# Patient Record
Sex: FEMALE | Race: WHITE | NOT HISPANIC OR LATINO
[De-identification: names, ages, dates, MRNs, and addresses within clinical notes are randomized per-mention and may not be internally consistent; named-entity substitution may affect disease eponyms.]

---

## 2019-02-19 PROBLEM — Z00.00 ENCOUNTER FOR PREVENTIVE HEALTH EXAMINATION: Status: ACTIVE | Noted: 2019-02-19

## 2019-03-12 ENCOUNTER — RECORD ABSTRACTING (OUTPATIENT)
Age: 64
End: 2019-03-12

## 2019-03-12 DIAGNOSIS — Z86.59 PERSONAL HISTORY OF OTHER MENTAL AND BEHAVIORAL DISORDERS: ICD-10-CM

## 2019-03-12 DIAGNOSIS — Z81.8 FAMILY HISTORY OF OTHER MENTAL AND BEHAVIORAL DISORDERS: ICD-10-CM

## 2019-03-12 DIAGNOSIS — Z82.49 FAMILY HISTORY OF ISCHEMIC HEART DISEASE AND OTHER DISEASES OF THE CIRCULATORY SYSTEM: ICD-10-CM

## 2019-03-12 DIAGNOSIS — Z83.71 FAMILY HISTORY OF COLONIC POLYPS: ICD-10-CM

## 2019-03-12 DIAGNOSIS — Z84.1 FAMILY HISTORY OF DISORDERS OF KIDNEY AND URETER: ICD-10-CM

## 2019-03-12 DIAGNOSIS — Z87.891 PERSONAL HISTORY OF NICOTINE DEPENDENCE: ICD-10-CM

## 2019-04-30 ENCOUNTER — APPOINTMENT (OUTPATIENT)
Dept: RHEUMATOLOGY | Facility: CLINIC | Age: 64
End: 2019-04-30
Payer: COMMERCIAL

## 2019-04-30 VITALS
SYSTOLIC BLOOD PRESSURE: 130 MMHG | DIASTOLIC BLOOD PRESSURE: 80 MMHG | WEIGHT: 185 LBS | HEIGHT: 68 IN | BODY MASS INDEX: 28.04 KG/M2

## 2019-04-30 DIAGNOSIS — Z78.9 OTHER SPECIFIED HEALTH STATUS: ICD-10-CM

## 2019-04-30 DIAGNOSIS — M79.646 PAIN IN UNSPECIFIED FINGER(S): ICD-10-CM

## 2019-04-30 DIAGNOSIS — Z86.39 PERSONAL HISTORY OF OTHER ENDOCRINE, NUTRITIONAL AND METABOLIC DISEASE: ICD-10-CM

## 2019-04-30 DIAGNOSIS — Z82.69 FAMILY HISTORY OF OTHER DISEASES OF THE MUSCULOSKELETAL SYSTEM AND CONNECTIVE TISSUE: ICD-10-CM

## 2019-04-30 PROCEDURE — 99244 OFF/OP CNSLTJ NEW/EST MOD 40: CPT

## 2019-04-30 RX ORDER — CHOLECALCIFEROL (VITAMIN D3) 50 MCG
50 MCG TABLET ORAL
Refills: 0 | Status: ACTIVE | COMMUNITY
Start: 2019-04-30

## 2019-04-30 NOTE — HISTORY OF PRESENT ILLNESS
[FreeTextEntry1] : 64 yo female referred by Dr. Kent for evaluation of hand pain.  She reports pain at the base of her thumbs, L>R, for many years.  A few weeks ago, she woke up and her right pinky finger was painful and swollen.  She takes 2 tabs of Advil if the pain gets really bad, which helps sometimes.  Pain is only with use.  \par No rashes.  No personal or FH of psoriasis.  \par She has low back pain, worse in the morning when she first wakes up, with associated stiffness.\par No oral ulcers.  No dry eyes or dry mouth.  No Raynauds.  No photosensitivity.\par + fatigue.  She falls asleep but has difficulty staying asleep.  She wakes up feeling tired. \par Her legs feel heavy at night.\par She also reports knee pain when going up or down stairs.

## 2019-04-30 NOTE — DATA REVIEWED
[FreeTextEntry1] : labs from 4/19/19 reviewed: RF negative, anti-CCP negative, anti-dsDNA negative, MARIA EUGENIA negative, lyme negative, UA negative, TSH normal, vit D 30.9, vit B12 452, HIV negative, HCV negative, CBC and CMP normal,

## 2019-04-30 NOTE — ASSESSMENT
[FreeTextEntry1] : Chelly has clinical findings consistent with a diagnosis of osteoarthritis.  X-ray of hands ordered to assess for evidence of erosive OA in her right PIP5.  We have discussed using topical NSAIDs up to 4 times a day, and she may add oral NSAIDs PRN (Advil 3 tabs or Aleve 1-2 tabs) severe pain.  There is no clinical evidence of rheumatoid arthritis at this time.

## 2019-06-12 ENCOUNTER — APPOINTMENT (OUTPATIENT)
Dept: BREAST CENTER | Facility: CLINIC | Age: 64
End: 2019-06-12
Payer: COMMERCIAL

## 2019-06-12 VITALS
HEIGHT: 68 IN | DIASTOLIC BLOOD PRESSURE: 94 MMHG | BODY MASS INDEX: 28.19 KG/M2 | SYSTOLIC BLOOD PRESSURE: 150 MMHG | HEART RATE: 66 BPM | OXYGEN SATURATION: 98 % | WEIGHT: 186 LBS | RESPIRATION RATE: 18 BRPM

## 2019-06-12 PROCEDURE — 99203 OFFICE O/P NEW LOW 30 MIN: CPT

## 2019-06-12 NOTE — PHYSICAL EXAM
[Symmetrical] : symmetrical [Examined in the supine and seated position] : examined in the supine and seated position [No dominant masses] : no dominant masses in right breast  [No dominant masses] : no dominant masses left breast [No Nipple Retraction] : no left nipple retraction [No Nipple Discharge] : no left nipple discharge [No Axillary Lymphadenopathy] : no right axillary lymphadenopathy [No Rashes] : no rashes [No Ulceration] : no ulceration [de-identified] : + ecchymosis at 0100 region at the site of biopsy [de-identified] : + palpable, soft, large LAD

## 2019-06-19 ENCOUNTER — RESULT REVIEW (OUTPATIENT)
Age: 64
End: 2019-06-19

## 2019-06-20 ENCOUNTER — RESULT REVIEW (OUTPATIENT)
Age: 64
End: 2019-06-20

## 2019-06-21 ENCOUNTER — RESULT REVIEW (OUTPATIENT)
Age: 64
End: 2019-06-21

## 2019-06-25 ENCOUNTER — RESULT REVIEW (OUTPATIENT)
Age: 64
End: 2019-06-25

## 2019-06-25 ENCOUNTER — OTHER (OUTPATIENT)
Age: 64
End: 2019-06-25

## 2019-06-25 ENCOUNTER — MOBILE ON CALL (OUTPATIENT)
Age: 64
End: 2019-06-25

## 2019-06-26 ENCOUNTER — RESULT REVIEW (OUTPATIENT)
Age: 64
End: 2019-06-26

## 2019-06-26 ENCOUNTER — APPOINTMENT (OUTPATIENT)
Dept: BREAST CENTER | Facility: HOSPITAL | Age: 64
End: 2019-06-26

## 2019-07-01 ENCOUNTER — APPOINTMENT (OUTPATIENT)
Dept: BREAST CENTER | Facility: CLINIC | Age: 64
End: 2019-07-01
Payer: COMMERCIAL

## 2019-07-01 VITALS
BODY MASS INDEX: 28.19 KG/M2 | OXYGEN SATURATION: 99 % | HEART RATE: 64 BPM | DIASTOLIC BLOOD PRESSURE: 99 MMHG | WEIGHT: 186 LBS | SYSTOLIC BLOOD PRESSURE: 169 MMHG | HEIGHT: 68 IN | RESPIRATION RATE: 18 BRPM

## 2019-07-01 PROCEDURE — 99024 POSTOP FOLLOW-UP VISIT: CPT

## 2019-07-01 NOTE — PHYSICAL EXAM
[Examined in the supine and seated position] : examined in the supine and seated position [Symmetrical] : symmetrical [No Axillary Lymphadenopathy] : no left axillary lymphadenopathy [No Rashes] : no rashes [No Ulceration] : no ulceration [de-identified] : + ecchymosis to right breast and left axillary incision. no signs of infection noted.  [de-identified] : + contact dermatitis to upper quadrants of abdomen.

## 2019-07-15 ENCOUNTER — TRANSCRIPTION ENCOUNTER (OUTPATIENT)
Age: 64
End: 2019-07-15

## 2019-08-07 ENCOUNTER — RESULT REVIEW (OUTPATIENT)
Age: 64
End: 2019-08-07

## 2019-08-07 ENCOUNTER — APPOINTMENT (OUTPATIENT)
Dept: HEMATOLOGY ONCOLOGY | Facility: CLINIC | Age: 64
End: 2019-08-07
Payer: COMMERCIAL

## 2019-08-07 VITALS
OXYGEN SATURATION: 96 % | TEMPERATURE: 97.5 F | HEART RATE: 58 BPM | SYSTOLIC BLOOD PRESSURE: 160 MMHG | DIASTOLIC BLOOD PRESSURE: 94 MMHG | HEIGHT: 68 IN | RESPIRATION RATE: 18 BRPM

## 2019-08-07 DIAGNOSIS — R59.0 LOCALIZED ENLARGED LYMPH NODES: ICD-10-CM

## 2019-08-07 DIAGNOSIS — E53.8 DEFICIENCY OF OTHER SPECIFIED B GROUP VITAMINS: ICD-10-CM

## 2019-08-07 DIAGNOSIS — E55.9 VITAMIN D DEFICIENCY, UNSPECIFIED: ICD-10-CM

## 2019-08-07 DIAGNOSIS — M19.041 PRIMARY OSTEOARTHRITIS, RIGHT HAND: ICD-10-CM

## 2019-08-07 DIAGNOSIS — M19.042 PRIMARY OSTEOARTHRITIS, RIGHT HAND: ICD-10-CM

## 2019-08-07 PROCEDURE — 99244 OFF/OP CNSLTJ NEW/EST MOD 40: CPT

## 2019-08-07 NOTE — CONSULT LETTER
[Dear  ___] : Dear  [unfilled], [Consult Letter:] : I had the pleasure of evaluating your patient, [unfilled]. [Please see my note below.] : Please see my note below. [Consult Closing:] : Thank you very much for allowing me to participate in the care of this patient.  If you have any questions, please do not hesitate to contact me. [Sincerely,] : Sincerely, [FreeTextEntry3] : Genia Keith MD\par [facility]\par

## 2019-08-07 NOTE — HISTORY OF PRESENT ILLNESS
[de-identified] : 64 year old female presents today for intraductal papilloma.  Patient states that after completing a routine mammogram In May showed new right breast microcalcifications, biopsy recommended, and developing LN's in left breast in which biopsy was indicated.  On 6/4/19 patient underwent right breast FNA which showed portions of sclerosing intraductal papilloma with calcifications and associated ductactasis.  Left axilla predominately small lymphocytes , possibly reactive.  Patient saw Dr. Espinosa for consult and a right breast excision on 6/26/19 showed fibrocystic changes, including usual ductal hyperplasia, apocrine metaplasia/hyperplasia.  Left axillary LN has 10 reactive LN's negative for carcinoma (1/10).  Patient denies any family history of malignances, never a smoker, drinks wine daily.  Took OCP's for approximately 3 years, patient is gravid 3, para 3.  Never had a BMD, follows with gyn yearly.

## 2019-08-07 NOTE — ASSESSMENT
[FreeTextEntry1] : 65 yo female referred by Dr. Danni Schwartz for evaluation of right breast papilloma and left axillary reactive adenopathy. \par PROS - c/o increased fatigue.\par Reviewed with patient reactive lymph node diagnosis.\par Labs send for inflammatory markers.\par Plan for observation.\par Mammogram in 6 months.

## 2019-08-07 NOTE — PHYSICAL EXAM
[Fully active, able to carry on all pre-disease performance without restriction] : Status 0 - Fully active, able to carry on all pre-disease performance without restriction [Normal] : grossly intact [de-identified] : right breast scar, left axilla seroma

## 2019-09-19 ENCOUNTER — APPOINTMENT (OUTPATIENT)
Dept: BARIATRICS | Facility: CLINIC | Age: 64
End: 2019-09-19
Payer: COMMERCIAL

## 2019-09-19 VITALS
HEIGHT: 68 IN | WEIGHT: 190 LBS | SYSTOLIC BLOOD PRESSURE: 136 MMHG | HEART RATE: 72 BPM | BODY MASS INDEX: 28.79 KG/M2 | DIASTOLIC BLOOD PRESSURE: 90 MMHG

## 2019-09-19 PROCEDURE — 99205 OFFICE O/P NEW HI 60 MIN: CPT

## 2019-09-19 NOTE — CONSULT LETTER
[Dear  ___] : Dear  [unfilled], [Consult Letter:] : I had the pleasure of evaluating your patient, [unfilled]. [( Thank you for referring [unfilled] for consultation for _____ )] : Thank you for referring [unfilled] for consultation for [unfilled] [Please see my note below.] : Please see my note below. [Consult Closing:] : Thank you very much for allowing me to participate in the care of this patient.  If you have any questions, please do not hesitate to contact me. [Sincerely,] : Sincerely, [FreeTextEntry3] : Christina Gaines

## 2019-09-19 NOTE — HISTORY OF PRESENT ILLNESS
[FreeTextEntry1] : 64 year old female for medical weight loss consultation.  Patient has been struggling with her weight for years but worsened this year.  \par Diagnosed with breast ca in 6/2019.\par History of depression/anxiety taking lexapro and wellbutrin was seeing a psychiatrist who no longer takes her insurance.  She increased her lexapro after her mother's death but does not feel that she needs that high of a dose anymore.  Did not see a relation to increasing lexapro and her weight gain.\par Lowest adult weight 155\par Highest 190\par Average weight 170\par Occupation- office work\par Exercise- none\par Water intake- inadequate\par Sleep patterns- poor\par Food recall- B- scone, coffee L- swordfish yohannes slaw melon D- pork roast salad S- dark chocolate, chips/salty snacks at 8-9PM, wakes up in the middle of the night and eats carbs to help go back to sleep

## 2019-09-19 NOTE — ASSESSMENT
[FreeTextEntry1] : Patient to start exercising 3x a week, encouraged her to exercise at 8-9PM when she feels the need to snack.  She identifies that she is snacking out of habit and not out of hunger.  Discouraged her from eating carbs late a night.  Encouraged to try melatonin to help sleep instead.  Reviewed better snack/meal choices to include more protein, less carbs\par Will increase water intake\par Patient would like to cut down on her Lexapro- advised her to titrate dosage slowly 2.5mg/2 weeks and stop at 5mg.  Can titrate lower if she feels well to come off but if she notices any mood changes she should stay on higher dose.  Will increase Wellbutrin to 300mg/day.  \par F/U appt with RD- keep food dairy \par F/U with me 4-6 weeks

## 2019-09-26 ENCOUNTER — CLINICAL ADVICE (OUTPATIENT)
Age: 64
End: 2019-09-26

## 2019-10-28 ENCOUNTER — APPOINTMENT (OUTPATIENT)
Dept: BARIATRICS | Facility: CLINIC | Age: 64
End: 2019-10-28

## 2019-11-15 ENCOUNTER — APPOINTMENT (OUTPATIENT)
Dept: BARIATRICS | Facility: CLINIC | Age: 64
End: 2019-11-15
Payer: COMMERCIAL

## 2019-11-15 VITALS — BODY MASS INDEX: 28.66 KG/M2 | WEIGHT: 188.5 LBS

## 2019-11-15 VITALS — HEART RATE: 73 BPM | SYSTOLIC BLOOD PRESSURE: 135 MMHG | DIASTOLIC BLOOD PRESSURE: 85 MMHG

## 2019-11-15 PROCEDURE — 99214 OFFICE O/P EST MOD 30 MIN: CPT

## 2019-11-15 RX ORDER — OXYCODONE AND ACETAMINOPHEN 5; 325 MG/1; MG/1
5-325 TABLET ORAL EVERY 6 HOURS
Qty: 12 | Refills: 0 | Status: DISCONTINUED | COMMUNITY
Start: 2019-06-25 | End: 2019-11-15

## 2019-11-15 RX ORDER — BUPROPION HYDROCHLORIDE 450 MG/1
450 TABLET, FILM COATED, EXTENDED RELEASE ORAL
Refills: 0 | Status: ACTIVE | COMMUNITY

## 2019-11-15 RX ORDER — HYDROCHLOROTHIAZIDE 12.5 MG/1
12.5 TABLET ORAL DAILY
Refills: 0 | Status: DISCONTINUED | COMMUNITY
End: 2019-11-15

## 2019-11-15 RX ORDER — DICLOFENAC SODIUM 10 MG/G
1 GEL TOPICAL
Qty: 1 | Refills: 5 | Status: DISCONTINUED | COMMUNITY
Start: 2019-04-30 | End: 2019-11-15

## 2019-11-15 RX ORDER — ESCITALOPRAM OXALATE 10 MG/1
10 TABLET ORAL DAILY
Refills: 0 | Status: DISCONTINUED | COMMUNITY
End: 2019-11-15

## 2019-11-15 RX ORDER — ALPRAZOLAM 0.5 MG/1
0.5 TABLET ORAL
Qty: 3 | Refills: 0 | Status: DISCONTINUED | COMMUNITY
Start: 2019-06-12 | End: 2019-11-15

## 2019-11-15 NOTE — CONSULT LETTER
[Dear  ___] : Dear  [unfilled], [Courtesy Letter:] : I had the pleasure of seeing your patient, [unfilled], in my office today. [Please see my note below.] : Please see my note below. [Consult Closing:] : Thank you very much for allowing me to participate in the care of this patient.  If you have any questions, please do not hesitate to contact me. [Sincerely,] : Sincerely, [FreeTextEntry3] : Christina Gaines

## 2019-11-15 NOTE — HISTORY OF PRESENT ILLNESS
[FreeTextEntry1] : 64 year old female for medical weight loss consultation.  Patient has been struggling with her weight for years but worsened this year.  \par Diagnosed with breast ca in 6/2019.\par History of depression/anxiety taking lexapro and wellbutrin was seeing a psychiatrist who no longer takes her insurance.  She increased her lexapro after her mother's death but does not feel that she needs that high of a dose anymore.  Did not see a relation to increasing lexapro and her weight gain.\par Lowest adult weight 155\par Highest 190\par Average weight 170\par Occupation- office work\par Exercise- none\par Water intake- inadequate\par Sleep patterns- poor\par Food recall- B- scone, coffee L- swordfish yohannes slaw melon D- pork roast salad S- dark chocolate, chips/salty snacks at 8-9PM, wakes up in the middle of the night and eats carbs to help go back to sleep\par \par 11/15/19- Patient RTO feeling well but has been struggling with episodes of dizziness while doing yoga- spoke to Dr. Gagnon about it and she changed her BP medication dosage.  Had increased her Wellbutrin to 450mg/day without any perceived side effects.  Tapered off Lexapro and feeling well.  Doing well with water intake, cut down wine to 1 occasional glass. still struggling with sleep, doing better with avoiding evening snacking behavior.  Saw SCAR Fuller- following instructions for low carbohydrate low fat diet.

## 2019-11-15 NOTE — ASSESSMENT
[FreeTextEntry1] : Patient to continue healthy diet changes, avoiding evening snack\par Continue to increase to exercise 3x a week\par Will start Saxenda- reviewed possible side effects including nausea, GERD, dizziness, headaches, dehydration, pancreatitis, medullary thyroid cancer \par Reviewed injection technique, needles safety, medication storage, did first injection together.  Sample provided [id number] Exp 5/2021\par Reviewed possible dizziness associated with Saxenda- given patient's current episodes advised to hold off on further injections until her previous symptoms have disappeared- she plans to f/u with Dr. Gagnon.  Also may consider the dizziness can be due to higher dose of Wellbutrin.  \par RTO 4-6 weeks

## 2019-12-05 ENCOUNTER — TRANSCRIPTION ENCOUNTER (OUTPATIENT)
Age: 64
End: 2019-12-05

## 2019-12-13 ENCOUNTER — RESULT REVIEW (OUTPATIENT)
Age: 64
End: 2019-12-13

## 2019-12-16 ENCOUNTER — APPOINTMENT (OUTPATIENT)
Dept: BREAST CENTER | Facility: CLINIC | Age: 64
End: 2019-12-16
Payer: COMMERCIAL

## 2019-12-16 VITALS
WEIGHT: 179 LBS | HEIGHT: 68 IN | OXYGEN SATURATION: 99 % | HEART RATE: 75 BPM | SYSTOLIC BLOOD PRESSURE: 140 MMHG | RESPIRATION RATE: 18 BRPM | DIASTOLIC BLOOD PRESSURE: 95 MMHG | BODY MASS INDEX: 27.13 KG/M2

## 2019-12-16 PROCEDURE — 99213 OFFICE O/P EST LOW 20 MIN: CPT

## 2019-12-20 ENCOUNTER — RX RENEWAL (OUTPATIENT)
Age: 64
End: 2019-12-20

## 2019-12-26 ENCOUNTER — TRANSCRIPTION ENCOUNTER (OUTPATIENT)
Age: 64
End: 2019-12-26

## 2019-12-26 ENCOUNTER — APPOINTMENT (OUTPATIENT)
Dept: BARIATRICS | Facility: CLINIC | Age: 64
End: 2019-12-26

## 2019-12-26 RX ORDER — PEN NEEDLE, DIABETIC 29 G X1/2"
32G X 4 MM NEEDLE, DISPOSABLE MISCELLANEOUS
Qty: 1 | Refills: 3 | Status: DISCONTINUED | COMMUNITY
Start: 2019-11-15 | End: 2019-12-26

## 2019-12-26 RX ORDER — LIRAGLUTIDE 6 MG/ML
18 INJECTION, SOLUTION SUBCUTANEOUS
Qty: 1 | Refills: 2 | Status: DISCONTINUED | COMMUNITY
Start: 2019-11-15 | End: 2019-12-26

## 2019-12-26 RX ORDER — LIRAGLUTIDE 6 MG/ML
18 INJECTION SUBCUTANEOUS DAILY
Qty: 1 | Refills: 5 | Status: DISCONTINUED | COMMUNITY
Start: 2019-12-20 | End: 2019-12-26

## 2019-12-27 RX ORDER — DULAGLUTIDE 0.75 MG/.5ML
0.75 INJECTION, SOLUTION SUBCUTANEOUS
Qty: 1 | Refills: 5 | Status: DISCONTINUED | COMMUNITY
Start: 2019-12-26 | End: 2019-12-27

## 2019-12-27 RX ORDER — ISOPROPYL ALCOHOL 70 ML/100ML
SWAB TOPICAL
Qty: 1 | Refills: 3 | Status: DISCONTINUED | COMMUNITY
Start: 2019-11-15 | End: 2019-12-27

## 2020-02-26 ENCOUNTER — APPOINTMENT (OUTPATIENT)
Dept: HEMATOLOGY ONCOLOGY | Facility: CLINIC | Age: 65
End: 2020-02-26

## 2020-04-26 ENCOUNTER — MESSAGE (OUTPATIENT)
Age: 65
End: 2020-04-26

## 2020-05-04 ENCOUNTER — APPOINTMENT (OUTPATIENT)
Age: 65
End: 2020-05-04

## 2020-05-05 LAB
SARS-COV-2 IGG SERPL IA-ACNC: 4.9 AU/ML
SARS-COV-2 IGG SERPL QL IA: NEGATIVE

## 2020-05-09 ENCOUNTER — APPOINTMENT (OUTPATIENT)
Dept: DISASTER EMERGENCY | Facility: CLINIC | Age: 65
End: 2020-05-09

## 2021-03-09 ENCOUNTER — APPOINTMENT (OUTPATIENT)
Dept: VASCULAR SURGERY | Facility: CLINIC | Age: 66
End: 2021-03-09
Payer: COMMERCIAL

## 2021-03-09 ENCOUNTER — RESULT REVIEW (OUTPATIENT)
Age: 66
End: 2021-03-09

## 2021-03-09 PROCEDURE — 99204 OFFICE O/P NEW MOD 45 MIN: CPT

## 2021-03-09 PROCEDURE — 99072 ADDL SUPL MATRL&STAF TM PHE: CPT

## 2021-04-02 ENCOUNTER — APPOINTMENT (OUTPATIENT)
Dept: BARIATRICS | Facility: CLINIC | Age: 66
End: 2021-04-02
Payer: COMMERCIAL

## 2021-04-02 VITALS — WEIGHT: 183.5 LBS | BODY MASS INDEX: 27.9 KG/M2

## 2021-04-02 DIAGNOSIS — Z86.718 PERSONAL HISTORY OF OTHER VENOUS THROMBOSIS AND EMBOLISM: ICD-10-CM

## 2021-04-02 PROCEDURE — 99214 OFFICE O/P EST MOD 30 MIN: CPT | Mod: 95

## 2021-04-02 RX ORDER — LIRAGLUTIDE 6 MG/ML
18 INJECTION, SOLUTION SUBCUTANEOUS DAILY
Qty: 1 | Refills: 1 | Status: DISCONTINUED | COMMUNITY
Start: 2021-04-02 | End: 2021-04-02

## 2021-04-02 RX ORDER — ISOPROPYL ALCOHOL 70 ML/100ML
SWAB TOPICAL
Qty: 1 | Refills: 3 | Status: DISCONTINUED | COMMUNITY
Start: 2021-04-02 | End: 2021-04-02

## 2021-04-02 RX ORDER — PEN NEEDLE, DIABETIC 29 G X1/2"
32G X 4 MM NEEDLE, DISPOSABLE MISCELLANEOUS
Qty: 1 | Refills: 2 | Status: DISCONTINUED | COMMUNITY
Start: 2021-04-02 | End: 2021-04-02

## 2021-04-02 NOTE — ASSESSMENT
[FreeTextEntry1] : 65 year old female overweight with HTN\par Behavioral goals- start tracking food intake with mary kay, have large breakfast with smaller lunch and dinner, 1/2 plate vegetables.  Continue daily walking exercise add resistance/strength training- sent fitness resource guide.  Will have PCP draw labs at physical next month- need to f/u hypercalcemia- sent patient email reminder\par Can restart medications but insurance coverage was problematic in the past.  Has [facility]- will start with Saxenda but then try rybelsus, ozempic, etc until we can find coverage.  Can also consider plenitity \par RTO 1 month after starting medication

## 2021-04-02 NOTE — HISTORY OF PRESENT ILLNESS
[FreeTextEntry1] : 64 year old female for medical weight loss consultation.  Patient has been struggling with her weight for years but worsened this year.  \par Diagnosed with breast ca in 6/2019.\par History of depression/anxiety taking lexapro and wellbutrin was seeing a psychiatrist who no longer takes her insurance.  She increased her lexapro after her mother's death but does not feel that she needs that high of a dose anymore.  Did not see a relation to increasing lexapro and her weight gain.\par Lowest adult weight 155\par Highest 190\par Average weight 170\par Occupation- office work\par Exercise- none\par Water intake- inadequate\par Sleep patterns- poor\par Food recall- B- scone, coffee L- swordfish yohannes slaw melon D- pork roast salad S- dark chocolate, chips/salty snacks at 8-9PM, wakes up in the middle of the night and eats carbs to help go back to sleep\par \par 11/15/19- Patient RTO feeling well but has been struggling with episodes of dizziness while doing yoga- spoke to Dr. Gagnon about it and she changed her BP medication dosage.  Had increased her Wellbutrin to 450mg/day without any perceived side effects.  Tapered off Lexapro and feeling well.  Doing well with water intake, cut down wine to 1 occasional glass. still struggling with sleep, doing better with avoiding evening snacking behavior.  Saw SCAR Fuller- following instructions for low carbohydrate low fat diet.  \par \par 4/2/21 Patient RTO to reestablish care.  Had foot surgery in 1/2020 and then was tied up in the pandemic.  Has her daughter's wedding in November of this year.  Walking daily 2-3 miles.  +hunger except when she eats a large breakfast, starting melatonin to help with sleep often wakes up at 3am, would like to restart medications.

## 2021-04-02 NOTE — REASON FOR VISIT
[Home] : at home, [unfilled] , at the time of the visit. [Other Location: e.g. Home (Enter Location, City,State)___] : at [unfilled] [Verbal consent obtained from patient] : the patient, [unfilled] [Follow-Up Visit] : a follow-up visit for [Hypertension] : hypertension [Overweight] : overweight

## 2021-04-10 ENCOUNTER — NON-APPOINTMENT (OUTPATIENT)
Age: 66
End: 2021-04-10

## 2021-04-13 ENCOUNTER — APPOINTMENT (OUTPATIENT)
Dept: VASCULAR SURGERY | Facility: CLINIC | Age: 66
End: 2021-04-13
Payer: COMMERCIAL

## 2021-04-13 VITALS — WEIGHT: 183 LBS | HEIGHT: 68 IN | BODY MASS INDEX: 27.74 KG/M2

## 2021-04-13 VITALS
SYSTOLIC BLOOD PRESSURE: 136 MMHG | HEART RATE: 70 BPM | DIASTOLIC BLOOD PRESSURE: 89 MMHG | WEIGHT: 183 LBS | BODY MASS INDEX: 27.74 KG/M2 | HEIGHT: 68 IN

## 2021-04-13 PROCEDURE — 93970 EXTREMITY STUDY: CPT

## 2021-04-13 PROCEDURE — 99214 OFFICE O/P EST MOD 30 MIN: CPT

## 2021-04-13 PROCEDURE — 99072 ADDL SUPL MATRL&STAF TM PHE: CPT

## 2021-04-13 RX ORDER — CHLORHEXIDINE GLUCONATE 4 %
1000 LIQUID (ML) TOPICAL
Refills: 0 | Status: DISCONTINUED | COMMUNITY
End: 2021-04-13

## 2021-04-13 RX ORDER — DULAGLUTIDE 0.75 MG/.5ML
0.75 INJECTION, SOLUTION SUBCUTANEOUS
Qty: 1 | Refills: 2 | Status: DISCONTINUED | COMMUNITY
Start: 2021-04-05 | End: 2021-04-13

## 2021-04-13 RX ORDER — LOSARTAN POTASSIUM AND HYDROCHLOROTHIAZIDE 12.5; 5 MG/1; MG/1
50-12.5 TABLET ORAL DAILY
Refills: 0 | Status: DISCONTINUED | COMMUNITY
End: 2021-04-13

## 2021-04-13 RX ORDER — SEMAGLUTIDE 1.34 MG/ML
2 INJECTION, SOLUTION SUBCUTANEOUS
Qty: 1 | Refills: 2 | Status: DISCONTINUED | COMMUNITY
Start: 2021-04-03 | End: 2021-04-13

## 2021-04-13 RX ORDER — ORAL SEMAGLUTIDE 3 MG/1
3 TABLET ORAL
Qty: 30 | Refills: 0 | Status: DISCONTINUED | COMMUNITY
Start: 2021-04-02 | End: 2021-04-13

## 2021-04-13 NOTE — CONSULT LETTER
[Dear  ___] : Dear  [unfilled], [Consult Letter:] : I had the pleasure of evaluating your patient, [unfilled]. [Please see my note below.] : Please see my note below. [Consult Closing:] : Thank you very much for allowing me to participate in the care of this patient.  If you have any questions, please do not hesitate to contact me. [Sincerely,] : Sincerely, [FreeTextEntry2] : Maddie Kent [FreeTextEntry3] : Leesa Celaya

## 2021-04-13 NOTE — REVIEW OF SYSTEMS
[Lower Ext Edema] : lower extremity edema [As Noted in HPI] : as noted in HPI [Negative] : Heme/Lymph [Leg Claudication] : no intermittent leg claudication

## 2021-04-13 NOTE — PHYSICAL EXAM
[Normal Breath Sounds] : Normal breath sounds [Normal Heart Sounds] : normal heart sounds [Ankle Swelling (On Exam)] : present [] : present [JVD] : no jugular venous distention  [de-identified] : NAD. Awake and alert [de-identified] : Normocephalic atraumatic. [FreeTextEntry1] : All pulses palpable in bilateral upper and lower extremities.  Bilateral lower right large varicose veins greater than 3 mm in diameter.  Largest vein on the medial aspect of the right calf and thigh. [de-identified] : Soft, NT, ND. No guarding. No palpable masses. No HSM [de-identified] : No CVA tenderness [de-identified] : Normal muscle bulk and tone. FROM in all extremities.\par  [de-identified] : Venous stasis changes and large more than 3 cm in diameters varicose veins [de-identified] : Strength and sensation intact in bilateral lower extremities

## 2021-04-13 NOTE — ASSESSMENT
[FreeTextEntry1] : 65-year-old female with symptomatic bilateral lower extremity varicose veins, right more symptomatic than the left.\par Patient failed nonoperative management with leg compression elevation NSAIDs and skin care.\par After discussion of treatment options patient would like to proceed with stab phlebectomy of large varicose veins of right lower extremity first. [Arterial/Venous Disease] : arterial/venous disease

## 2021-04-13 NOTE — HISTORY OF PRESENT ILLNESS
[FreeTextEntry1] : Patient is a 65-year-old female with past medical history of hypertension, hyperlipidemia and a bilateral lower extremity varicose and spider veins presented for a follow-up.  Since last visit she was compliant with bilateral lower extremity compression socks leg elevation, good skin care and NSAIDs without substantial relief of her symptoms.  Symptoms remains unchanged with burning tingling pain and discomfort.  She has large more than 3 mm varicose veins throughout bilateral lower extremities right more than left with no ulceration.\par \par \par PSHx: Patient has a history of right lower extremity venous closures.

## 2021-04-13 NOTE — PROCEDURE
[FreeTextEntry1] : Bilateral lower extremity venous insufficiency ultrasound performed demonstrating closed right greater saphenous vein.  Reflux of right anterior saphenous and lesser saphenous vein.

## 2021-05-04 ENCOUNTER — RESULT REVIEW (OUTPATIENT)
Age: 66
End: 2021-05-04

## 2021-05-07 ENCOUNTER — APPOINTMENT (OUTPATIENT)
Dept: VASCULAR SURGERY | Facility: HOSPITAL | Age: 66
End: 2021-05-07

## 2021-05-07 DIAGNOSIS — I83.893 VARICOSE VEINS OF BILATERAL LOWER EXTREMITIES WITH OTHER COMPLICATIONS: ICD-10-CM

## 2021-05-11 ENCOUNTER — APPOINTMENT (OUTPATIENT)
Dept: VASCULAR SURGERY | Facility: CLINIC | Age: 66
End: 2021-05-11
Payer: COMMERCIAL

## 2021-05-11 VITALS
SYSTOLIC BLOOD PRESSURE: 154 MMHG | HEIGHT: 68 IN | DIASTOLIC BLOOD PRESSURE: 105 MMHG | WEIGHT: 183 LBS | HEART RATE: 74 BPM | BODY MASS INDEX: 27.74 KG/M2

## 2021-05-11 PROCEDURE — 99024 POSTOP FOLLOW-UP VISIT: CPT

## 2021-05-12 NOTE — DISCUSSION/SUMMARY
[FreeTextEntry1] : 65-year-old female with bilateral lower extremity varicose veins now status post right lower extremity stab phlebectomy.  Patient is doing well.  All sutures have been removed.  Steri-Strips applied.  Patient will continue with leg compression and elevation as needed.  She will follow-up as needed

## 2021-05-12 NOTE — REASON FOR VISIT
[de-identified] : Right lower extremity stab phlebectomy [de-identified] : May 6, 2021 [de-identified] : 5 [de-identified] : 65-year-old female status post stab phlebectomy.  Of right lower extremity.  She complained of postoperative pain controlled with Tylenol 3 as needed.  She has ecchymosis of right lower extremity.  No drainage.  All incisions are healing well.

## 2021-05-12 NOTE — REVIEW OF SYSTEMS
[Leg Claudication] : no intermittent leg claudication [Lower Ext Edema] : lower extremity edema [As Noted in HPI] : as noted in HPI [Negative] : Heme/Lymph [de-identified] : The phlebectomy incisions healing well.  Multiple bruises.

## 2021-05-12 NOTE — PHYSICAL EXAM
[JVD] : no jugular venous distention  [Ankle Swelling (On Exam)] : present [Varicose Veins Of Lower Extremities] : present [Varicose Veins Of The Left Leg] : of the left leg [] : not present [Calm] : calm [de-identified] : Awake alert and oriented.  No acute distress. [de-identified] : Normocephalic atraumatic.  Extraocular muscles intact. [FreeTextEntry1] : All pulses palpable bilaterally.  Bilateral varicose veins present.  She is status post stab phlebectomy of her right lower extremity multiple varicose veins.  She is doing well postoperatively.  Incisions healing well.  Skin with mild ecchymosis diffuse. [de-identified] : Soft nontender nondistended. [de-identified] : Normal muscle bulk and tone. [de-identified] : Right lower extremity diffuse ecchymosis status post procedure.  All incisions are clean dry and intact.  Closed with a suture in place.

## 2021-09-30 ENCOUNTER — RX RENEWAL (OUTPATIENT)
Age: 66
End: 2021-09-30

## 2021-11-13 VITALS — BODY MASS INDEX: 25.39 KG/M2 | WEIGHT: 167 LBS

## 2022-01-06 ENCOUNTER — NON-APPOINTMENT (OUTPATIENT)
Age: 67
End: 2022-01-06

## 2022-01-07 VITALS — WEIGHT: 162 LBS | BODY MASS INDEX: 24.63 KG/M2

## 2022-07-14 ENCOUNTER — NON-APPOINTMENT (OUTPATIENT)
Age: 67
End: 2022-07-14

## 2022-08-04 ENCOUNTER — NON-APPOINTMENT (OUTPATIENT)
Age: 67
End: 2022-08-04

## 2022-08-05 ENCOUNTER — NON-APPOINTMENT (OUTPATIENT)
Age: 67
End: 2022-08-05

## 2022-08-05 ENCOUNTER — APPOINTMENT (OUTPATIENT)
Dept: CARDIOLOGY | Facility: CLINIC | Age: 67
End: 2022-08-05

## 2022-08-05 VITALS
SYSTOLIC BLOOD PRESSURE: 120 MMHG | HEIGHT: 68 IN | WEIGHT: 165 LBS | BODY MASS INDEX: 25.01 KG/M2 | DIASTOLIC BLOOD PRESSURE: 80 MMHG | HEART RATE: 76 BPM | OXYGEN SATURATION: 99 %

## 2022-08-05 DIAGNOSIS — I10 ESSENTIAL (PRIMARY) HYPERTENSION: ICD-10-CM

## 2022-08-05 DIAGNOSIS — F41.9 ANXIETY DISORDER, UNSPECIFIED: ICD-10-CM

## 2022-08-05 DIAGNOSIS — R07.89 OTHER CHEST PAIN: ICD-10-CM

## 2022-08-05 DIAGNOSIS — Z87.898 PERSONAL HISTORY OF OTHER SPECIFIED CONDITIONS: ICD-10-CM

## 2022-08-05 DIAGNOSIS — R53.83 OTHER FATIGUE: ICD-10-CM

## 2022-08-05 PROCEDURE — 99204 OFFICE O/P NEW MOD 45 MIN: CPT | Mod: 25

## 2022-08-05 PROCEDURE — 93000 ELECTROCARDIOGRAM COMPLETE: CPT | Mod: 59

## 2022-08-05 PROCEDURE — 93015 CV STRESS TEST SUPVJ I&R: CPT

## 2022-08-05 RX ORDER — HYDROCHLOROTHIAZIDE 25 MG/1
25 TABLET ORAL
Refills: 0 | Status: ACTIVE | COMMUNITY

## 2022-08-05 RX ORDER — POTASSIUM CHLORIDE 750 MG/1
10 TABLET, FILM COATED, EXTENDED RELEASE ORAL
Qty: 30 | Refills: 0 | Status: ACTIVE | COMMUNITY
Start: 2022-03-08

## 2022-08-05 NOTE — HISTORY OF PRESENT ILLNESS
[FreeTextEntry1] : Patient's blood pressure has been well controlled on hydrochlorothiazide and losartan 25 mg daily\par \par The patient managed to lose about 20 pounds by taking the weight loss version of Ozempic.

## 2022-08-05 NOTE — DISCUSSION/SUMMARY
[FreeTextEntry1] : Patient's cardiorespiratory examination is normal.  Her resting blood pressure today was normal.  In order to evaluate her exertional symptoms I performed a plain treadmill stress test.  This revealed no evidence of exercise-induced myocardial ischemia or arrhythmias and the patient demonstrated good exercise capacity she was able to exercise into Sukumar stage III without difficulty.\par I cannot attribute the patient's exercise related symptoms to any objective evidence of a cardiac origin.  I recommended that she try to maintain her program of exercise and I believe her exercise capacity should improve.\par Requesting that a copy of the patient's lipid profile be sent here from her primary care physician.

## 2022-08-05 NOTE — REASON FOR VISIT
[FreeTextEntry1] : Patient comes for evaluation I had last seen her in May 2018.  She has taken up a program of exercise at a gymnasium.  She feels that sometimes she tires a bit easily on the exercise machines and also feels fatigued after exercise there has been no chest pain or specific symptoms of palpitations or shortness of breath.\par Had evaluated her previously because of symptoms of chest discomfort.  Patient has been treated for hypertension and also was treated for some symptoms of anxiety.  At the time of her last visit I did not believe her symptom was of a coronary in nature.  Currently she does not complain of chest pain.

## 2022-08-08 ENCOUNTER — APPOINTMENT (OUTPATIENT)
Dept: CARDIOLOGY | Facility: CLINIC | Age: 67
End: 2022-08-08

## 2023-03-29 ENCOUNTER — NON-APPOINTMENT (OUTPATIENT)
Age: 68
End: 2023-03-29

## 2023-03-29 ENCOUNTER — APPOINTMENT (OUTPATIENT)
Dept: BARIATRICS/WEIGHT MGMT | Facility: CLINIC | Age: 68
End: 2023-03-29
Payer: COMMERCIAL

## 2023-03-29 VITALS
DIASTOLIC BLOOD PRESSURE: 88 MMHG | BODY MASS INDEX: 26.03 KG/M2 | WEIGHT: 171.2 LBS | SYSTOLIC BLOOD PRESSURE: 135 MMHG | HEART RATE: 68 BPM

## 2023-03-29 DIAGNOSIS — E66.3 OVERWEIGHT: ICD-10-CM

## 2023-03-29 PROCEDURE — 99213 OFFICE O/P EST LOW 20 MIN: CPT

## 2023-03-29 RX ORDER — PEN NEEDLE, DIABETIC 29 G X1/2"
32G X 4 MM NEEDLE, DISPOSABLE MISCELLANEOUS
Qty: 1 | Refills: 2 | Status: DISCONTINUED | COMMUNITY
Start: 2021-05-29 | End: 2023-03-29

## 2023-03-29 RX ORDER — SEMAGLUTIDE 1.34 MG/ML
2 INJECTION, SOLUTION SUBCUTANEOUS
Qty: 1 | Refills: 2 | Status: DISCONTINUED | COMMUNITY
Start: 2022-03-04 | End: 2023-03-29

## 2023-03-29 RX ORDER — SEMAGLUTIDE 0.5 MG/.5ML
0.5 INJECTION, SOLUTION SUBCUTANEOUS
Qty: 1 | Refills: 0 | Status: ACTIVE | COMMUNITY
Start: 2023-03-29 | End: 1900-01-01

## 2023-03-29 RX ORDER — NITROFURANTOIN (MONOHYDRATE/MACROCRYSTALS) 25; 75 MG/1; MG/1
100 CAPSULE ORAL
Qty: 10 | Refills: 0 | Status: DISCONTINUED | COMMUNITY
Start: 2022-05-18 | End: 2023-03-29

## 2023-03-29 RX ORDER — ACETAMINOPHEN AND CODEINE 300; 30 MG/1; MG/1
300-30 TABLET ORAL
Qty: 20 | Refills: 0 | Status: DISCONTINUED | COMMUNITY
Start: 2021-05-07 | End: 2023-03-29

## 2023-03-29 RX ORDER — LIRAGLUTIDE 6 MG/ML
18 INJECTION, SOLUTION SUBCUTANEOUS
Qty: 1 | Refills: 0 | Status: DISCONTINUED | OUTPATIENT
Start: 2022-04-08 | End: 2023-03-29

## 2023-03-29 RX ORDER — SEMAGLUTIDE 0.5 MG/.5ML
0.5 INJECTION, SOLUTION SUBCUTANEOUS
Qty: 1 | Refills: 5 | Status: DISCONTINUED | COMMUNITY
Start: 2021-07-28 | End: 2023-03-29

## 2023-03-29 RX ORDER — LOSARTAN POTASSIUM 50 MG/1
50 TABLET, FILM COATED ORAL
Refills: 0 | Status: ACTIVE | COMMUNITY
Start: 2022-08-05

## 2023-03-29 RX ORDER — BUPROPION HYDROCHLORIDE 150 MG/1
150 TABLET, EXTENDED RELEASE ORAL
Qty: 30 | Refills: 0 | Status: DISCONTINUED | COMMUNITY
Start: 2021-08-11 | End: 2023-03-29

## 2023-03-29 RX ORDER — BUPROPION HYDROCHLORIDE 300 MG/1
300 TABLET, EXTENDED RELEASE ORAL
Qty: 30 | Refills: 0 | Status: DISCONTINUED | COMMUNITY
Start: 2021-08-11 | End: 2023-03-29

## 2023-03-29 RX ORDER — LIRAGLUTIDE 6 MG/ML
18 INJECTION, SOLUTION SUBCUTANEOUS DAILY
Qty: 1 | Refills: 5 | Status: DISCONTINUED | COMMUNITY
Start: 2021-05-29 | End: 2023-03-29

## 2023-03-29 RX ORDER — ISOPROPYL ALCOHOL 70 ML/100ML
SWAB TOPICAL
Qty: 1 | Refills: 3 | Status: DISCONTINUED | COMMUNITY
Start: 2021-05-29 | End: 2023-03-29

## 2023-03-29 RX ORDER — SEMAGLUTIDE 0.5 MG/.5ML
0.5 INJECTION, SOLUTION SUBCUTANEOUS
Qty: 1 | Refills: 0 | Status: DISCONTINUED | COMMUNITY
Start: 2021-07-16 | End: 2023-03-29

## 2023-03-29 RX ORDER — SEMAGLUTIDE 0.25 MG/.5ML
0.25 INJECTION, SOLUTION SUBCUTANEOUS
Qty: 1 | Refills: 5 | Status: DISCONTINUED | COMMUNITY
Start: 2021-09-02 | End: 2023-03-29

## 2023-03-29 NOTE — HISTORY OF PRESENT ILLNESS
[FreeTextEntry1] : 64 year old female for medical weight loss consultation.  Patient has been struggling with her weight for years but worsened this year.  \par Diagnosed with breast ca in 6/2019.\par History of depression/anxiety taking lexapro and wellbutrin was seeing a psychiatrist who no longer takes her insurance.  She increased her lexapro after her mother's death but does not feel that she needs that high of a dose anymore.  Did not see a relation to increasing lexapro and her weight gain.\par Lowest adult weight 155\par Highest 190\par Average weight 170\par Occupation- office work\par Exercise- none\par Water intake- inadequate\par Sleep patterns- poor\par Food recall- B- scone, coffee L- swordfish yohannes slaw melon D- pork roast salad S- dark chocolate, chips/salty snacks at 8-9PM, wakes up in the middle of the night and eats carbs to help go back to sleep\par \par 11/15/19- Patient RTO feeling well but has been struggling with episodes of dizziness while doing yoga- spoke to Dr. Gagnon about it and she changed her BP medication dosage.  Had increased her Wellbutrin to 450mg/day without any perceived side effects.  Tapered off Lexapro and feeling well.  Doing well with water intake, cut down wine to 1 occasional glass. still struggling with sleep, doing better with avoiding evening snacking behavior.  Saw SCAR Fuller- following instructions for low carbohydrate low fat diet.  \par \par 4/2/21 Patient RTO to reestablish care.  Had foot surgery in 1/2020 and then was tied up in the pandemic.  Has her daughter's wedding in November of this year.  Walking daily 2-3 miles.  +hunger except when she eats a large breakfast, starting melatonin to help with sleep often wakes up at 3am, would like to restart medications.  \par \par 3/29/23- Patient to reestablish care today.  Has gained 10 pounds since coming off Wegovy.  More nighttime eating and preoccupation with food.  +mild constipation going to establish care with Dr. Thomas tomorrow.  Eating 3 meals/day.

## 2023-03-29 NOTE — ASSESSMENT
[FreeTextEntry1] : 67 year old female overweight with h/o HTN \par Patient given sample of Wegovy  Exp 10/31/23, [id number] so the may continue her therapy- her highest weight was 183 prior to starting therapy and lowest weight was 161 pounds.  \par Will refer her to Dr. Echeverria or Mckenzie Jackson to work on nighttime eating behavior\par Continue exercise regimen\par F/U 1 month

## 2023-03-30 ENCOUNTER — APPOINTMENT (OUTPATIENT)
Dept: GASTROENTEROLOGY | Facility: CLINIC | Age: 68
End: 2023-03-30
Payer: COMMERCIAL

## 2023-03-30 ENCOUNTER — LABORATORY RESULT (OUTPATIENT)
Age: 68
End: 2023-03-30

## 2023-03-30 ENCOUNTER — APPOINTMENT (OUTPATIENT)
Dept: BARIATRICS/WEIGHT MGMT | Facility: CLINIC | Age: 68
End: 2023-03-30
Payer: COMMERCIAL

## 2023-03-30 VITALS
SYSTOLIC BLOOD PRESSURE: 126 MMHG | BODY MASS INDEX: 25.61 KG/M2 | RESPIRATION RATE: 16 BRPM | WEIGHT: 169 LBS | DIASTOLIC BLOOD PRESSURE: 88 MMHG | HEIGHT: 68 IN | OXYGEN SATURATION: 98 % | HEART RATE: 72 BPM

## 2023-03-30 DIAGNOSIS — K58.1 IRRITABLE BOWEL SYNDROME WITH CONSTIPATION: ICD-10-CM

## 2023-03-30 DIAGNOSIS — R14.3 FLATULENCE: ICD-10-CM

## 2023-03-30 DIAGNOSIS — R14.0 ABDOMINAL DISTENSION (GASEOUS): ICD-10-CM

## 2023-03-30 PROCEDURE — 99204 OFFICE O/P NEW MOD 45 MIN: CPT

## 2023-03-30 PROCEDURE — 36415 COLL VENOUS BLD VENIPUNCTURE: CPT

## 2023-03-30 RX ORDER — LINACLOTIDE 145 UG/1
145 CAPSULE, GELATIN COATED ORAL
Qty: 30 | Refills: 2 | Status: ACTIVE | COMMUNITY
Start: 2023-03-30 | End: 1900-01-01

## 2023-03-30 NOTE — HISTORY OF PRESENT ILLNESS
[FreeTextEntry1] : 68 yo F here for chronic constipation.\par \par Has had constipation issues entire life\par this leads to hemorrhoids, has seen colorectal surgeon in the past - used medication. \par Pt complains bloating and gas\par In terms of BMs- wont go for a few days, then goes 3-4 times. but 99% of the time struggles and has a hard stool. straining. \par no blood.\par Takes 1 packet miralax daily, 2 stool softeners, and fiber gummies but ongoing symptoms, has tried all OTC agents without benefit.\par \par Last colonoscopy: 6-7 years ago.  repeat in 10 years.\par \par Soc: no tobacco or significant EtOH\par \par Family Hx: no significant GI family history including colon cancer, stomach cancer, IBD, celiac\par \par ROS:\par constitutional: no weight loss, fevers\par ENT: no deafness\par Eyes: no blindness\par Neck: no lymphadenopathy\par Chest: no shortness of breath, no cough\par Cardiac: no chest pain, no palpitations\par Vascular: no leg swelling\par GI: no abdominal pain, nausea, vomiting, diarrhea, constipation, rectal bleeding, melena, dysphagia,  unless otherwise noted in HPI\par : no dysuria, dark urine\par Skin: no rashes, lesions, jaundice\par Heme: no bleeding\par Endocrine: no DM  unless otherwise stated in HPI\par \par Physical Exam: (VS noted below)\par General: alert, comfortable, in no acute distress\par Eyes: normal sclera, anicteric\par Neck: normal, supple, no neck mass\par Pulm: no respiratory distress, clear to auscultation bilaterally \par Heart: RRR, normal S1 S2\par Abd: Soft, non-tender, non-distended, normal bowel sounds, no appreciable hepatosplenomegaly, no masses palpated\par Rectal: deferred\par Ext: warm and well perfused, no edema\par Skin: no rashes, no juandice\par Neuro: alert, grossly nonfocal\par \par Labs/imaging/prior endoscopic results were reviewed to the extent available and pertinent findings noted in HPI\par

## 2023-03-30 NOTE — CONSULT LETTER
[Dear  ___] : Dear  [unfilled], [Consult Letter:] : I had the pleasure of evaluating your patient, [unfilled]. [Please see my note below.] : Please see my note below. [Consult Closing:] : Thank you very much for allowing me to participate in the care of this patient.  If you have any questions, please do not hesitate to contact me. [Sincerely,] : Sincerely, [FreeTextEntry3] : Mala Thomas M.D.\par

## 2023-03-30 NOTE — ASSESSMENT
[FreeTextEntry1] : 66 yo F here with chronic constipation , gas/bloating, occasional abd pain consistent IBS-C. \par Pt has severe constipation and has not responded to OTC medications including miralax, senna, dulcolax, milk of  magnesia. She is a candidate for prescription secretagogue - will start w/ Linzess. \par \par - linzess 145\par - add on celiac to your labs\par - discussed dietary chagnes and reducing stress\par - elminiate garlic/ onion, consider low fodmap,  monitor if dairy is a trigger\par \par due for screening colonoscopy in 2-3 years (she will ask her PCP when she is due)\par \par f/u 2 -4 months

## 2023-04-01 DIAGNOSIS — E78.5 HYPERLIPIDEMIA, UNSPECIFIED: ICD-10-CM

## 2023-04-01 LAB
ALBUMIN SERPL ELPH-MCNC: 4.5 G/DL
ALP BLD-CCNC: 84 U/L
ALT SERPL-CCNC: 14 U/L
ANION GAP SERPL CALC-SCNC: 12 MMOL/L
AST SERPL-CCNC: 19 U/L
BASOPHILS # BLD AUTO: 0.04 K/UL
BASOPHILS NFR BLD AUTO: 0.6 %
BILIRUB SERPL-MCNC: 0.4 MG/DL
BUN SERPL-MCNC: 18 MG/DL
CALCIUM SERPL-MCNC: 10 MG/DL
CHLORIDE SERPL-SCNC: 99 MMOL/L
CHOLEST SERPL-MCNC: 240 MG/DL
CO2 SERPL-SCNC: 25 MMOL/L
CREAT SERPL-MCNC: 1.03 MG/DL
EGFR: 60 ML/MIN/1.73M2
EOSINOPHIL # BLD AUTO: 0.08 K/UL
EOSINOPHIL NFR BLD AUTO: 1.3 %
ESTIMATED AVERAGE GLUCOSE: 103 MG/DL
GLUCOSE SERPL-MCNC: 85 MG/DL
HBA1C MFR BLD HPLC: 5.2 %
HCT VFR BLD CALC: 40.9 %
HDLC SERPL-MCNC: 65 MG/DL
HGB BLD-MCNC: 13.2 G/DL
IMM GRANULOCYTES NFR BLD AUTO: 0.3 %
INSULIN P FAST SERPL-ACNC: 9.7 UU/ML
LDLC SERPL CALC-MCNC: 159 MG/DL
LYMPHOCYTES # BLD AUTO: 0.97 K/UL
LYMPHOCYTES NFR BLD AUTO: 15.6 %
MAN DIFF?: NORMAL
MCHC RBC-ENTMCNC: 30.1 PG
MCHC RBC-ENTMCNC: 32.3 GM/DL
MCV RBC AUTO: 93.2 FL
MONOCYTES # BLD AUTO: 0.54 K/UL
MONOCYTES NFR BLD AUTO: 8.7 %
NEUTROPHILS # BLD AUTO: 4.58 K/UL
NEUTROPHILS NFR BLD AUTO: 73.5 %
NONHDLC SERPL-MCNC: 175 MG/DL
PLATELET # BLD AUTO: 258 K/UL
POTASSIUM SERPL-SCNC: 4.3 MMOL/L
PROT SERPL-MCNC: 7.1 G/DL
RBC # BLD: 4.39 M/UL
RBC # FLD: 12.2 %
SODIUM SERPL-SCNC: 135 MMOL/L
TRIGL SERPL-MCNC: 79 MG/DL
TSH SERPL-ACNC: 1.24 UIU/ML
WBC # FLD AUTO: 6.23 K/UL

## 2023-04-06 LAB
CELIACPAN: NORMAL
IGA SER QL IEP: 149 MG/DL

## 2023-04-06 RX ORDER — LINACLOTIDE 290 UG/1
290 CAPSULE, GELATIN COATED ORAL
Qty: 30 | Refills: 2 | Status: ACTIVE | COMMUNITY
Start: 2023-04-06 | End: 1900-01-01

## 2023-04-07 ENCOUNTER — NON-APPOINTMENT (OUTPATIENT)
Age: 68
End: 2023-04-07

## 2023-04-26 ENCOUNTER — APPOINTMENT (OUTPATIENT)
Dept: BARIATRICS/WEIGHT MGMT | Facility: CLINIC | Age: 68
End: 2023-04-26

## 2023-05-26 ENCOUNTER — RESULT REVIEW (OUTPATIENT)
Age: 68
End: 2023-05-26

## 2023-05-26 ENCOUNTER — APPOINTMENT (OUTPATIENT)
Dept: ORTHOPEDIC SURGERY | Facility: CLINIC | Age: 68
End: 2023-05-26
Payer: COMMERCIAL

## 2023-05-26 VITALS — BODY MASS INDEX: 25.31 KG/M2 | HEIGHT: 68 IN | WEIGHT: 167 LBS

## 2023-05-26 DIAGNOSIS — M17.12 UNILATERAL PRIMARY OSTEOARTHRITIS, LEFT KNEE: ICD-10-CM

## 2023-05-26 PROCEDURE — 99203 OFFICE O/P NEW LOW 30 MIN: CPT

## 2023-05-26 NOTE — PHYSICAL EXAM
[de-identified] : Knee ranges 5-115 degrees with pain and crepitus\par stable to varus, valgus, anterior and posterior stress\par neurovascularly intact distally\par no pain with hip range of motion\par negative straight leg raise\par no effusion, synovitis, or edema or erythema\par skin intact  [de-identified] : mild-mod oa

## 2023-05-26 NOTE — HISTORY OF PRESENT ILLNESS
[de-identified] : left knee pain for 4 months\par no trauma\par pain in front of knee\par tried nsaids \par able to do pickleball and spin

## 2023-05-26 NOTE — DISCUSSION/SUMMARY
[de-identified] : left knee arthritis\par will start with pt\par if no better will come back for cortisone shot\par

## 2023-06-02 ENCOUNTER — APPOINTMENT (OUTPATIENT)
Dept: BARIATRICS | Facility: CLINIC | Age: 68
End: 2023-06-02

## 2023-06-07 ENCOUNTER — TRANSCRIPTION ENCOUNTER (OUTPATIENT)
Age: 68
End: 2023-06-07

## 2024-08-30 ENCOUNTER — APPOINTMENT (OUTPATIENT)
Dept: BARIATRICS | Facility: CLINIC | Age: 69
End: 2024-08-30

## 2024-12-25 PROBLEM — F10.90 ALCOHOL USE: Status: ACTIVE | Noted: 2019-03-12

## 2025-06-25 ENCOUNTER — NON-APPOINTMENT (OUTPATIENT)
Age: 70
End: 2025-06-25

## 2025-06-25 PROBLEM — R14.3 FLATULENCE: Status: RESOLVED | Noted: 2023-03-30 | Resolved: 2025-06-25

## 2025-06-25 PROBLEM — M79.646 PAIN IN FINGER: Status: RESOLVED | Noted: 2019-04-30 | Resolved: 2025-06-25

## 2025-06-25 PROBLEM — R14.0 ABDOMINAL BLOATING: Status: RESOLVED | Noted: 2023-03-30 | Resolved: 2025-06-25

## 2025-06-25 PROBLEM — Z87.898 HISTORY OF FATIGUE: Status: RESOLVED | Noted: 2022-08-05 | Resolved: 2025-06-25

## 2025-06-26 ENCOUNTER — NON-APPOINTMENT (OUTPATIENT)
Age: 70
End: 2025-06-26

## 2025-06-27 ENCOUNTER — APPOINTMENT (OUTPATIENT)
Dept: CARDIOLOGY | Facility: CLINIC | Age: 70
End: 2025-06-27
Payer: COMMERCIAL

## 2025-06-27 ENCOUNTER — NON-APPOINTMENT (OUTPATIENT)
Age: 70
End: 2025-06-27

## 2025-06-27 VITALS
WEIGHT: 169 LBS | DIASTOLIC BLOOD PRESSURE: 82 MMHG | OXYGEN SATURATION: 99 % | BODY MASS INDEX: 26.53 KG/M2 | HEIGHT: 67 IN | SYSTOLIC BLOOD PRESSURE: 134 MMHG | HEART RATE: 80 BPM

## 2025-06-27 PROBLEM — R68.89 EXERCISE INTOLERANCE: Status: ACTIVE | Noted: 2025-06-27

## 2025-06-27 PROBLEM — Z86.718 HISTORY OF DEEP VENOUS THROMBOSIS: Status: RESOLVED | Noted: 2021-04-02 | Resolved: 2025-06-27

## 2025-06-27 PROBLEM — Z82.49 FAMILY HISTORY OF VALVULAR HEART DISEASE: Status: ACTIVE | Noted: 2025-06-27

## 2025-06-27 PROBLEM — Z78.9 CAFFEINE USE: Status: ACTIVE | Noted: 2025-06-27

## 2025-06-27 PROBLEM — E87.6 HYPOKALEMIA: Status: ACTIVE | Noted: 2025-06-27

## 2025-06-27 PROBLEM — E66.3 OVERWEIGHT: Status: ACTIVE | Noted: 2019-09-19

## 2025-06-27 PROCEDURE — 99215 OFFICE O/P EST HI 40 MIN: CPT | Mod: 25

## 2025-06-27 PROCEDURE — 93000 ELECTROCARDIOGRAM COMPLETE: CPT

## 2025-06-27 RX ORDER — ATORVASTATIN CALCIUM 20 MG/1
20 TABLET, FILM COATED ORAL
Refills: 0 | Status: ACTIVE | COMMUNITY

## 2025-07-11 ENCOUNTER — APPOINTMENT (OUTPATIENT)
Dept: CARDIOLOGY | Facility: CLINIC | Age: 70
End: 2025-07-11
Payer: COMMERCIAL

## 2025-07-11 PROCEDURE — 36415 COLL VENOUS BLD VENIPUNCTURE: CPT

## 2025-07-12 ENCOUNTER — TRANSCRIPTION ENCOUNTER (OUTPATIENT)
Age: 70
End: 2025-07-12

## 2025-07-16 ENCOUNTER — TRANSCRIPTION ENCOUNTER (OUTPATIENT)
Age: 70
End: 2025-07-16

## 2025-07-16 ENCOUNTER — APPOINTMENT (OUTPATIENT)
Dept: CARDIOLOGY | Facility: CLINIC | Age: 70
End: 2025-07-16
Payer: COMMERCIAL

## 2025-07-16 PROCEDURE — 93306 TTE W/DOPPLER COMPLETE: CPT

## 2025-07-17 ENCOUNTER — TRANSCRIPTION ENCOUNTER (OUTPATIENT)
Age: 70
End: 2025-07-17

## 2025-07-21 ENCOUNTER — APPOINTMENT (OUTPATIENT)
Dept: CARDIOLOGY | Facility: CLINIC | Age: 70
End: 2025-07-21